# Patient Record
Sex: MALE
[De-identification: names, ages, dates, MRNs, and addresses within clinical notes are randomized per-mention and may not be internally consistent; named-entity substitution may affect disease eponyms.]

---

## 2017-06-09 ENCOUNTER — HOSPITAL ENCOUNTER (EMERGENCY)
Dept: HOSPITAL 31 - C.ER | Age: 77
Discharge: HOME | End: 2017-06-09
Payer: MEDICARE

## 2017-06-09 VITALS
SYSTOLIC BLOOD PRESSURE: 175 MMHG | RESPIRATION RATE: 20 BRPM | HEART RATE: 68 BPM | TEMPERATURE: 97.9 F | DIASTOLIC BLOOD PRESSURE: 83 MMHG

## 2017-06-09 VITALS — OXYGEN SATURATION: 98 %

## 2017-06-09 DIAGNOSIS — J30.2: Primary | ICD-10-CM

## 2017-06-09 NOTE — C.PDOC
History Of Present Illness


Patient is a 75 y/o male that presents to the ED for evaluation of nasal 

congestion, ear discomfort, and post-nasal drip symptoms for the last 2 months. 

Pt reports decreased hearing sense from his left ear, and feels his ears are 

clogged. Pt reports being seen by PMD with similar complaints, and was 

prescribed several unknown medications with no relief. Otherwise, denies any 

chest pain, shortness of breath, fever, chills, or any other associated 

symptoms at this time. 


Time Seen by Provider: 06/09/17 08:41


Chief Complaint (Nursing): ENT Problem


History Per: Patient


History/Exam Limitations: None


Onset/Duration Of Symptoms: Days (2 months)


Current Symptoms Are (Timing): Still Present


Quality (Ear): denies: Pain W/Touch, Redness, Swelling, Discharge, Foreign Body





Past Medical History


Reviewed: Historical Data, Nursing Documentation, Vital Signs


Vital Signs: 


 Last Vital Signs











Temp  97.9 F   06/09/17 09:29


 


Pulse  68   06/09/17 09:29


 


Resp  20   06/09/17 09:29


 


BP  175/83 H  06/09/17 09:29


 


Pulse Ox  98   06/09/17 09:52














- Medical History


PMH: HTN


Family History: States: No Known Family Hx





- Social History


Hx Alcohol Use: No


Hx Substance Use: No





- Immunization History


Hx Tetanus Toxoid Vaccination: No


Hx Influenza Vaccination: No


Hx Pneumococcal Vaccination: No





Review Of Systems


Except As Marked, All Systems Reviewed And Found Negative.


Constitutional: Negative for: Fever, Chills


ENT: Positive for: Ear Pain (discomfort, decreased hearing from left ear), Nose 

Congestion, Throat Pain.  Negative for: Ear Discharge


Cardiovascular: Negative for: Chest Pain, Palpitations, Light Headedness


Respiratory: Positive for: Cough.  Negative for: Shortness of Breath


Gastrointestinal: Negative for: Nausea, Vomiting, Abdominal Pain





Physical Exam





- Physical Exam


Appears: Non-toxic, No Acute Distress


Skin: Normal Color, Warm, Dry


Head: Atraumatic, Normacephalic


Eye(s): bilateral: Normal Inspection, EOMI


Ear(s): Bilateral: Normal


Nose: Normal


Oral Mucosa: Moist


Throat: Normal, No Erythema, No Exudate, No Drooling


Neck: Normal ROM, Supple


Chest: Symmetrical, No Tenderness


Cardiovascular: Murmur (2/6 systolic murmur)


Respiratory: Normal Breath Sounds, No Rales, No Rhonchi, No Wheezing


Extremity: Normal ROM


Neurological/Psych: Oriented x3, Normal Speech, Normal Cognition





ED Course And Treatment


O2 Sat by Pulse Oximetry: 98 (on RA)


Pulse Ox Interpretation: Normal





Disposition





- Disposition


Referrals: 


Behin,Babak, MD [Staff Provider] - 


[facility] [Outside]


Disposition: HOME/ ROUTINE


Disposition Time: 09:07


Condition: GOOD


Additional Instructions: 


Call for an apt with dr Behin


Take all usual meds





Prescriptions: 


Cetirizine HCl [Zyrtec] 1 cap PO DAILY #30 capsule


Methylprednisolone [Medrol Dose Pack (21 tabs)] 1 kit PO .AS DIRECTED #21 packet


Instructions:  Allergic Rhinitis (ED)


Print Language: Hebrew





- Clinical Impression


Clinical Impression: 


 Seasonal allergies








- Scribe Statement


The provider has reviewed the documentation as recorded by the Scribe





Sevta Goldberg





All medical record entries made by the Scribe were at my direction and 

personally dictated by me. I have reviewed the chart and agree that the record 

accurately reflects my personal performance of the history, physical exam, 

medical decision making, and the department course for this patient. I have 

also personally directed, reviewed, and agree with the discharge instructions 

and disposition.

## 2018-10-30 ENCOUNTER — HOSPITAL ENCOUNTER (EMERGENCY)
Dept: HOSPITAL 31 - C.ER | Age: 78
Discharge: HOME | End: 2018-10-30
Payer: MEDICARE

## 2018-10-30 VITALS
HEART RATE: 54 BPM | DIASTOLIC BLOOD PRESSURE: 74 MMHG | RESPIRATION RATE: 16 BRPM | SYSTOLIC BLOOD PRESSURE: 145 MMHG | TEMPERATURE: 97.5 F

## 2018-10-30 VITALS — OXYGEN SATURATION: 98 %

## 2018-10-30 DIAGNOSIS — M54.5: ICD-10-CM

## 2018-10-30 DIAGNOSIS — I10: ICD-10-CM

## 2018-10-30 DIAGNOSIS — G89.29: Primary | ICD-10-CM

## 2018-10-30 DIAGNOSIS — M25.561: ICD-10-CM

## 2018-10-30 PROCEDURE — 73562 X-RAY EXAM OF KNEE 3: CPT

## 2018-10-30 PROCEDURE — 73521 X-RAY EXAM HIPS BI 2 VIEWS: CPT

## 2018-10-30 PROCEDURE — 99285 EMERGENCY DEPT VISIT HI MDM: CPT

## 2018-10-30 PROCEDURE — 96372 THER/PROPH/DIAG INJ SC/IM: CPT

## 2018-10-30 NOTE — RAD
PROCEDURE:  Radiographs of the pelvis and bilateral hips



HISTORY:

 fall 



COMPARISON:

None.



FINDINGS:



BONES:

Pelvis: Unremarkable.



Right hip:Unremarkable.



Left hip:Unremarkable.



JOINTS:

Right hip: Joint space narrowing. 



Left hip: Joint space narrowing. 



Sacroiliac Joints: Narrowed and sclerotic. 



Pubic symphysis: Narrowed and sclerotic. 



SOFT TISSUES:

Normal. 



OTHER FINDINGS:

None.



IMPRESSION:

No demonstrated fracture or dislocation.  Bilateral hip degenerative 

changes.

## 2018-10-30 NOTE — RAD
Date of service: 



10/30/2018



PROCEDURE:  Right Knee Radiographs.



HISTORY:

knee pain



COMPARISON:

None.



FINDINGS:



BONES:

No acute fracture. 



JOINTS:

Mild tricompartmental narrowing, worst in the medial tibial femoral 

compartment. 



JOINT EFFUSION:

None. 



OTHER FINDINGS:

Quadriceps tendon enthesophyte.



IMPRESSION:

No demonstrated fracture or dislocation.  Mild degenerative changes..
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
## 2018-12-11 ENCOUNTER — HOSPITAL ENCOUNTER (OUTPATIENT)
Dept: HOSPITAL 31 - C.SDS | Age: 78
Discharge: HOME | End: 2018-12-11
Attending: OTOLARYNGOLOGY
Payer: MEDICARE

## 2018-12-11 VITALS
DIASTOLIC BLOOD PRESSURE: 63 MMHG | HEART RATE: 68 BPM | SYSTOLIC BLOOD PRESSURE: 135 MMHG | TEMPERATURE: 97.5 F | RESPIRATION RATE: 18 BRPM

## 2018-12-11 VITALS — BODY MASS INDEX: 28.1 KG/M2

## 2018-12-11 VITALS — OXYGEN SATURATION: 97 %

## 2018-12-11 DIAGNOSIS — I25.10: ICD-10-CM

## 2018-12-11 DIAGNOSIS — J34.3: Primary | ICD-10-CM

## 2018-12-11 DIAGNOSIS — I10: ICD-10-CM

## 2018-12-11 DIAGNOSIS — J32.2: ICD-10-CM

## 2018-12-11 DIAGNOSIS — E11.9: ICD-10-CM

## 2018-12-11 DIAGNOSIS — J32.3: ICD-10-CM

## 2018-12-11 DIAGNOSIS — J32.0: ICD-10-CM

## 2018-12-11 DIAGNOSIS — H66.91: ICD-10-CM

## 2018-12-11 DIAGNOSIS — J34.2: ICD-10-CM

## 2018-12-11 PROCEDURE — 31020 EXPLORATION MAXILLARY SINUS: CPT

## 2018-12-11 PROCEDURE — 88311 DECALCIFY TISSUE: CPT

## 2018-12-11 PROCEDURE — 88304 TISSUE EXAM BY PATHOLOGIST: CPT

## 2018-12-11 PROCEDURE — 30130 EXCISE INFERIOR TURBINATE: CPT

## 2018-12-11 PROCEDURE — 31200 REMOVAL OF ETHMOID SINUS: CPT

## 2018-12-11 PROCEDURE — 69436 CREATE EARDRUM OPENING: CPT

## 2018-12-11 PROCEDURE — 82948 REAGENT STRIP/BLOOD GLUCOSE: CPT

## 2018-12-11 PROCEDURE — 30520 REPAIR OF NASAL SEPTUM: CPT

## 2018-12-11 PROCEDURE — 31050 EXPLORATION SPHENOID SINUS: CPT

## 2018-12-11 NOTE — OP
PROCEDURE DATE:  12/11/2018



PREOPERATIVE DIAGNOSIS:  Sinusitis, large turbinates, deviated septum,

right chronic otitis media.



POSTOPERATIVE DIAGNOSIS  Sinusitis, large turbinates, deviated septum,

right chronic otitis media.



PROCEDURE:  Endoscopic bilateral ethmoidectomy, endoscopic bilateral

maxillary antrostomy, endoscopic left sphenoidotomy, endoscopic bilateral

inferior turbinate reduction, right myringotomy with tubes.



FINDINGS:  Left sphenoid antrum stenosed, bilateral maxillary antrum

stenosed, sinusitis changes noted in the ethmoid sinuses.  The sphenoid

antrum stenosed.  Deviated septum, large inferior turbinates.  Fluid noted

behind the right TM.



PROCEDURE:  The patient was brought into room, placed in supine position. 

Anesthesia initiated through an ET tube.  The patient was draped in usual

manner.  The right ear was brought to view using operative microscope and

ear speculum.  Radial incision was made in the anterior-inferior quadrant

of the right eardrum.  Fluid was noted behind the TM and suctioned out. 

Tube was placed.  Floxin was placed.  Next, adrenaline-soaked pledgets were

inserted into nasal cavity, remained there for 5 minutes and removed.  The

septum was injected with lidocaine with epinephrine after the patient was

draped in usual manner.  A Clifford incision was made on the left and

mucoperichondrial flap was raised.  A vertical incision was made in the

cartilage leaving the 0.5 cm anterior superior strut and a

mucoperichondrial flap was raised on the other side.  Deviated portion of

the bone and cartilage were removed using chisel and forceps.  Quilting

suture was used to suture the two flaps together and close the Clifford

incision.  A zero-degree scope was inserted into the nasal cavity.  The

inferior turbinates was noted to be enlarged and reduced in size using

scissors going from inferior to superior, anterior posterior direction on

both sides, first on the left then on the right.  Next suction cautery was

used to control bleeding on both sides.  Navigation was set up and used in

order to ensure that the skull base and orbit were not entered.  The middle

turbinate was injected on both sides with lidocaine with epinephrine. 

Attention was turned to left and the turbinate was medialized.  The

uncinate process was medialized and removed.  The ethmoid bulla was entered

using a debrider inferomedially going posteriorly to the basal lamella

anterior and superiorly until the ethmoid bulla was removed.  The basal

lamella was entered.  Posterior ethmoid cells were entered and opened.  The

skull base was identified and followed anteriorly all the way to the area

of anterior ethmoid air cells.  Curved suction hooked up to navigation was

used to locate the maxillary antrum which was noted to be stenosed and

opened using forceps.  Next, the sphenoid antrum was located and opened

using forceps.  Bleeding was controlled using suction cautery and

adrenaline-soaked pledgets.  Attention was turned to the other side.  The

middle turbinate was medialized.  The uncinate process was medialized and

removed.  The ethmoid bulla was entered inferomedially going posteriorly to

the basal lamella anterior and superiorly until the ethmoid bulla was

removed.  The basal lamella was entered.  Posterior ethmoid cells were

entered and opened.  The skull base was identified and followed anteriorly

all the way to the area of the anterior ethmoid air cells.  Curved suction

hooked up to navigation was used to locate the maxillary antrum which was

noted to be stenosed and opened using forceps.  Bleeding was controlled

using adrenaline-soaked pledgets and suction cautery.  Splints were placed.

Stents  were placed.  The patient was taken off anesthesia and taken to

recovery room in stable manner.







__________________________________________

Babak Behin, MD



DD:  12/11/2018 9:25:40

DT:  12/11/2018 9:45:57

Job # [id number]

## 2018-12-12 ENCOUNTER — HOSPITAL ENCOUNTER (EMERGENCY)
Dept: HOSPITAL 31 - C.ER | Age: 78
Discharge: HOME | End: 2018-12-12
Payer: MEDICARE

## 2018-12-12 VITALS
OXYGEN SATURATION: 99 % | HEART RATE: 80 BPM | SYSTOLIC BLOOD PRESSURE: 150 MMHG | RESPIRATION RATE: 16 BRPM | DIASTOLIC BLOOD PRESSURE: 79 MMHG | TEMPERATURE: 98.6 F

## 2018-12-12 VITALS — BODY MASS INDEX: 28.1 KG/M2

## 2018-12-12 DIAGNOSIS — R04.0: Primary | ICD-10-CM

## 2018-12-12 NOTE — C.PDOC
History Of Present Illness


Patient presents to the ER with epistaxis. Patient had sinus surgery yesterday. 

Denies fever, chills, nausea, or vomiting.


Time Seen by Provider: 12/12/18 03:39


Chief Complaint (Nursing): ENT Problem


History Per: Patient


History/Exam Limitations: None


Onset/Duration Of Symptoms: Hrs


Current Symptoms Are (Timing): Still Present


Severity: Mild


Pain Scale Rating Of: 3


Anticoagulant/Antiplatlet Use?: No





Past Medical History


Reviewed: Historical Data, Nursing Documentation, Vital Signs


Vital Signs: 





                                Last Vital Signs











Temp  98.9 F   12/12/18 03:34


 


Pulse  101 H  12/12/18 03:34


 


Resp  18   12/12/18 03:34


 


BP  195/90 H  12/12/18 03:34


 


Pulse Ox  98   12/12/18 03:34














- Medical History


PMH: Back Problems, HTN, Hypercholesterolemia


   Denies: Chronic Kidney Disease


Surgical History: Endoscopy


Family History: States: No Known Family Hx





- Social History


Hx Alcohol Use: No


Hx Substance Use: No





- Immunization History


Hx Tetanus Toxoid Vaccination: No


Hx Influenza Vaccination: Yes


Hx Pneumococcal Vaccination: No





Review Of Systems


Constitutional: Negative for: Fever, Chills


ENT: Positive for: Other (Epistaxis)


Cardiovascular: Negative for: Chest Pain, Palpitations


Respiratory: Negative for: Cough, Shortness of Breath


Gastrointestinal: Negative for: Nausea, Vomiting


Psych: Positive for: Anxiety





Physical Exam





- Physical Exam


Appears: Non-toxic, Other (very anxious)


Skin: Warm, Dry


Nose: Other (Clots in bilateral nares)


Oral Mucosa: Moist


Throat: No Erythema, No Exudate, No Other (Clots)


Neck: Trachea Midline, Supple


Extremity: Normal ROM


Neurological/Psych: Oriented x3


Gait: Steady





ED Course And Treatment


O2 Sat by Pulse Oximetry: 98 (Room air)


Pulse Ox Interpretation: Normal





Disposition


Counseled Patient/Family Regarding: Studies Performed, Diagnosis





- Disposition


Referrals: 


Behin,Babak, MD [Staff Provider] - 


Disposition: HOME/ ROUTINE


Disposition Time: 03:39


Condition: FAIR


Additional Instructions: 


Please follow up with dr Behin


Instructions:  Nosebleeds (DC)


Forms:  [facility] (English)





- Clinical Impression


Clinical Impression: 


 Bleeding nose








- Scribe Statement


The provider has reviewed the documentation as recorded by the Scribe





Joni Cordova





All medical record entries made by the Scribe were at my direction and 

personally dictated by me. I have reviewed the chart and agree that the record 

accurately reflects my personal performance of the history, physical exam, 

medical decision making, and the department course for this patient. I have also

personally directed, reviewed, and agree with the discharge instructions and dis

position.